# Patient Record
Sex: MALE | Race: WHITE | Employment: FULL TIME | URBAN - METROPOLITAN AREA
[De-identification: names, ages, dates, MRNs, and addresses within clinical notes are randomized per-mention and may not be internally consistent; named-entity substitution may affect disease eponyms.]

---

## 2017-01-01 ENCOUNTER — HOSPITAL ENCOUNTER (EMERGENCY)
Age: 62
End: 2017-01-04
Attending: EMERGENCY MEDICINE
Payer: SUBSIDIZED

## 2017-01-01 DIAGNOSIS — I46.9 CARDIAC ARREST (HCC): Primary | ICD-10-CM

## 2017-01-01 PROCEDURE — 74011000250 HC RX REV CODE- 250: Performed by: EMERGENCY MEDICINE

## 2017-01-01 PROCEDURE — 92950 HEART/LUNG RESUSCITATION CPR: CPT

## 2017-01-01 PROCEDURE — 74011250636 HC RX REV CODE- 250/636: Performed by: EMERGENCY MEDICINE

## 2017-01-01 PROCEDURE — 99285 EMERGENCY DEPT VISIT HI MDM: CPT

## 2017-01-01 RX ORDER — EPINEPHRINE 0.1 MG/ML
1 INJECTION INTRACARDIAC; INTRAVENOUS
Status: COMPLETED | OUTPATIENT
Start: 2017-01-01 | End: 2017-01-01

## 2017-01-01 RX ORDER — SODIUM BICARBONATE 1 MEQ/ML
50 SYRINGE (ML) INTRAVENOUS ONCE
Status: COMPLETED | OUTPATIENT
Start: 2017-01-01 | End: 2017-01-01

## 2017-01-01 RX ORDER — ATROPINE SULFATE 1 MG/ML
1 INJECTION, SOLUTION INTRAVENOUS
Status: DISCONTINUED | OUTPATIENT
Start: 2017-01-01 | End: 2017-01-01

## 2017-01-01 RX ORDER — ATROPINE SULFATE 0.1 MG/ML
1 INJECTION INTRAVENOUS ONCE
Status: COMPLETED | OUTPATIENT
Start: 2017-01-01 | End: 2017-01-01

## 2017-01-01 RX ADMIN — SODIUM BICARBONATE 50 MEQ: 84 INJECTION, SOLUTION INTRAVENOUS at 20:41

## 2017-01-01 RX ADMIN — EPINEPHRINE 1 MG: 0.1 INJECTION, SOLUTION ENDOTRACHEAL; INTRACARDIAC; INTRAVENOUS at 20:40

## 2017-01-01 RX ADMIN — EPINEPHRINE 1 MG: 0.1 INJECTION, SOLUTION ENDOTRACHEAL; INTRACARDIAC; INTRAVENOUS at 20:38

## 2017-01-01 RX ADMIN — SODIUM BICARBONATE 50 MEQ: 84 INJECTION, SOLUTION INTRAVENOUS at 20:37

## 2017-01-01 RX ADMIN — ATROPINE SULFATE 1 MG: 1 INJECTION, SOLUTION INTRAMUSCULAR; INTRAVENOUS; SUBCUTANEOUS at 20:38

## 2017-01-01 RX ADMIN — EPINEPHRINE 1 MG: 0.1 INJECTION, SOLUTION ENDOTRACHEAL; INTRACARDIAC; INTRAVENOUS at 20:34

## 2017-01-01 RX ADMIN — ATROPINE SULFATE 1 MG: 0.1 INJECTION, SOLUTION INTRAVENOUS at 20:38

## 2017-01-04 NOTE — ED TRIAGE NOTES
Pt arrives via EMS in full arrest.  Pt intubated with 7.5 ETT at 23cm at teeth, CPR in progress. EMS reports call at 2000, arrival and code began at 2005.   Given 6 rounds of Epinephrine, 2mg Narcan, 2 amps Sodium Bicarb, intubated with 7.5 ETT, ventilations with ambu bag, IO to left lower leg, accu check 205

## 2017-01-04 NOTE — ED NOTES
Eye prep done per eye bank request.  ETT removed. Post mortem care provided and pt placed in [facility] and will go to [facility] [place] [facility].

## 2017-01-04 NOTE — ED PROVIDER NOTES
HPI Comments:   8:30 PM  Sandra Coy is a 63 y.o. male who presents to ED via EMS, intubated, in cardiac arrest. Pt had a witnessed arrest in front of parking lot at [place]. Pt received 5 epi in the field and bicarb. Cardiaca activity was achieved, but EMS was unable to find pt's HR or BP. PEA arrest continued. Patient is a 63 y.o. male presenting with cardiac arrest. The history is provided by the EMS personnel. No  was used. Cardiac arrest    This is a new problem. The problem has not changed since onset. No past medical history on file. No past surgical history on file. No family history on file. Social History     Social History    Marital status:      Spouse name: N/A    Number of children: N/A    Years of education: N/A     Occupational History    Not on file. Social History Main Topics    Smoking status: Not on file    Smokeless tobacco: Not on file    Alcohol use Not on file    Drug use: Not on file    Sexual activity: Not on file     Other Topics Concern    Not on file     Social History Narrative         ALLERGIES: Review of patient's allergies indicates not on file. Review of Systems   Unable to perform ROS: Acuity of condition     Vitals:    01/03/17 2036   Pulse: (!) 0            Physical Exam   Constitutional: He appears well-developed and well-nourished. HENT:   Head: Normocephalic and atraumatic. Eyes: Right eye exhibits no discharge. Left eye exhibits no discharge. Neck: Neck supple. No tracheal deviation present. No thyromegaly present. Cardiovascular:   No pulse   Pulmonary/Chest:   No respiratory effort   Abdominal: Soft. Musculoskeletal: He exhibits no edema or deformity. Neurological:   obtunded   Skin: Skin is warm and dry. MDM  Number of Diagnoses or Management Options  Cardiac arrest [facility]):     ED Course       Procedures  PROGRESS NOTE:  20:30 - CODE BLUE  20:32 - CPR initiated.   20:34 - 1st EPI administered. 20:35 - 1st Bicarb administered. 20:36 - Pulse checked: PEA, no pulse. 20:37 - 2nd EPI administered. 20:38 - 1st Atropine administered. 20:39 - Pulse check: Asystole, but no pulse. 20:40 - 3rd EPI administered. 20:41 - 2nd Bicarb administered. 20:42 - Pulse check: No pulse. 20:43 - Screening ultrasound reveals no cardiac activity. Been working pt for 20 minutes. 20:43 - Time of death called. CLINICAL IMPRESSION:    1. Cardiac arrest [facility])          ATTESTATIONS:  This note is prepared by Luzma East, acting as Scribe for [employer] Areli Luke, [address] [facility] Areli Luke MD : The scribe's documentation has been prepared under my direction and personally reviewed by me in its entirety. I confirm that the note above accurately reflects all work, treatment, procedures, and medical decision making performed by me.

## 2017-01-04 NOTE — ED NOTES
Aracely Marin at [facility] contacted re: pt's demise. Pt candidate for tissue and eye potentially.

## 2017-01-04 NOTE — ED NOTES
Able to contact Ophelia Lin on pt's cell phone. Per EMS pt was alone in [employer] parking lot. Yadira Altman listed on vehicle registration found in pt's car and was last number called from pt's cell phone. Yadira Altman states that she is pt's wife and is in [place]. Assisted MD Modesto Henry with calling Ms Jutsin Joseph.

## 2017-01-04 NOTE — PROGRESS NOTES
Bereavement Note:     responded to the death of Rafat Torres, who is a 63 y.o., male, offering Spiritual Care to patient and family, see flow sheets for interventions. Wife of  in [place], [place].  called and assisted Mar Heath with arrangements for transport of her 's body with [address] in [facility].  prayed on phone with Mar Heath. Her father and sister are staying with her for the night. Date of Death: 1/3/17  Time of Death:     Extended Emergency Contact Information  Primary Emergency Contact: [address] [place], [place] [address]shon [facility]  Relation: None                 YES      NO  UNKNOWN  Life Net   []        []    []   Eye Bank   [] [] []   Medical Examiner  []        [x]  []   Going to [employer]  []        [x] []      Autopsy   []        [x]         []   Sympathy Card  [x]        []  Bereavement Materials  []        [x]           Business Card Provided  [x]        []              Home: [address] - [phone] - Arranging for body of  to be picked up by [facility] who will fly the body to [place]. Wife, Mar Heath ([phone]) authorizes the embalming of her 's body for transport. Pt is eligible for eye donation and staff awaiting the Eye Bank to contact the family for authorization to remove  eyes. Chaplains will continue to follow family and will provide spiritual care as needed.      [address] [place],[address], [address] office  [phone] pager